# Patient Record
Sex: MALE
[De-identification: names, ages, dates, MRNs, and addresses within clinical notes are randomized per-mention and may not be internally consistent; named-entity substitution may affect disease eponyms.]

---

## 2019-03-04 ENCOUNTER — HOSPITAL ENCOUNTER (OUTPATIENT)
Dept: HOSPITAL 89 - RAD | Age: 24
End: 2019-03-04
Attending: INTERNAL MEDICINE
Payer: COMMERCIAL

## 2019-03-04 DIAGNOSIS — S86.912A: Primary | ICD-10-CM

## 2019-03-04 NOTE — RADIOLOGY IMAGING REPORT
[place] [facility] [place] 

 

PATIENT NAME: Edenilson Toussaint

: 1995

MR: [id number]

V: [id number]

EXAM DATE: 

ORDERING PHYSICIAN: MOISES SHEARER

TECHNOLOGIST: 

 

Location: [facility] - [place]

Patient: Edenilson Toussaint

: 1995

MRN: [id number]

Visit/Account:[id number]

Date of Sevice:  3/04/2019

 

ACCESSION #: [id number]

 

Technique: KNEE 3 VIEW LEFT

 

HISTORY: knee strain

 

Comparison studies:  None

 

FINDINGS: There is no acute fracture.  The alignment of the left knee.  No knee joint effusion.

 

IMPRESSION:

1.  No acute osseous process.

 

Report Dictated By: Gil Covarrubias DO at 3/4/2019 2:54 PM

 

Report E-Signed By: Gil Covarrubias DO  at 3/4/2019 2:55 PM

 

WSN:[id number]

## 2019-03-08 ENCOUNTER — HOSPITAL ENCOUNTER (OUTPATIENT)
Dept: HOSPITAL 89 - CT | Age: 24
End: 2019-03-08
Attending: INTERNAL MEDICINE
Payer: COMMERCIAL

## 2019-03-08 DIAGNOSIS — J32.9: Primary | ICD-10-CM

## 2019-03-08 DIAGNOSIS — S83.412A: ICD-10-CM

## 2019-03-08 PROCEDURE — 70486 CT MAXILLOFACIAL W/O DYE: CPT

## 2019-03-08 NOTE — RADIOLOGY IMAGING REPORT
FACILITY: [facility] 

 

PATIENT NAME: Edenilson Toussaint

: 1995

MR: [id number]

V: [id number]

EXAM DATE: 

ORDERING PHYSICIAN: MOISES SHEARER

TECHNOLOGIST: 

 

Location: [facility] - [place]

Patient: Edenilson Toussaint

: 1995

MRN: [id number]

Visit/Account:[id number]

Date of Sevice:  3/08/2019

 

ACCESSION #: [id number]

 

CT SINUS W/O CON

 

COMPARISONS: None

 

ADDITIONAL PERTINENT HISTORY:  Recurrent sinusitis

 

 

TECHNIQUE: Multiple axial images were obtained through the paranasal sinuses with coronal and sagitta
l reformatted images. No IV contrast was administered.  One of the following dose optimization techni
ques was utilized in the performance of this exam: Automated exposure control; adjustment of the mA a
nd/or kV according to the patient's size; or use of an iterative  reconstruction technique.  Specific
 details can be referenced in the facility's radiology CT exam operational policy.

 

FINDINGS:

 

Maxillary sinuses: Mild mucosal thickening involving the right maxillary sinus..

 

Frontal sinuses: Negative.

 

Ethmoid air cells: Negative.

 

Sphenoid sinuses:Negative.

 

Nasal septum: Negative.

 

Drainage pathways: Patent

 

Paranasal variance: None

 

Medial orbital walls, cribriform plate, and orbital floors: Negative.

 

Visualized bony skull base Negative.

 

Visualized intracranial contents: Negative.

 

Orbits and surrounding soft tissues: Negative.

 

IMPRESSION:

1. Mild mucosal thickening involving the right maxillary sinus.

2. Otherwise negative examination.

 

Report Dictated By: Ruben Guerrero MD at 3/8/2019 12:29 PM

 

Report E-Signed By: Ruben Guerrero MD  at 3/8/2019 12:32 PM

 

WSN:[id number]

## 2019-03-08 NOTE — RADIOLOGY IMAGING REPORT
FACILITY: [facility] 

 

PATIENT NAME: Edenilson Toussaint

: 1995

MR: [id number]

V: [id number]

EXAM DATE: 

ORDERING PHYSICIAN: MOISES SHEARER

TECHNOLOGIST: 

 

Location: [facility]

Patient: Edenilson Toussaint

: 1995

MRN: [id number]

Visit/Account:[id number]

Date of Sevice:  3/08/2019

 

ACCESSION #: [id number]

 

MR KNEE LT W/O CONTRAST

 

COMPARISON:  Left knee radiographs 2019

 

HISTORY:  left knee pain,swelling. Patient reports injury, not otherwise specified.

 

TECHNIQUE:  Noncontrast multiplanar MRI of the left knee utilizing T1 weighted and fluid sensitive se
quences.

 

CONTRAST:  None.

 

FINDINGS:

FLUID:  There is no effusion or Baker's cyst.

 

MENISCI:  The menisci are intact.

 

TENDONS/LIGAMENTS:  The anterior and posterior cruciate ligaments are intact.  Abnormal intermediate 
thickening and signal in the medial collateral ligament with mild adjacent soft tissue edema consiste
nt with acute intermediate grade MCL sprain. The fibular collateral ligament, biceps femoris tendon, 
iliotibial tract and popliteus tendon are intact and unremarkable.  Retinacula and extensor mechanism
 are intact and unremarkable.

 

MUSCLES:  There is no muscle atrophy or edema.

 

CARTILAGE:  No significant cartilage defects in the knee.

 

BONES:  Normal marrow signal and alignment.

 

OTHER:  Negative.

 

 

IMPRESSION:

Recent intermediate grade sprain of the medial collateral ligament of the left knee.

 

 

Report Dictated By: Abdoulaye Roberts at 3/8/2019 2:46 PM

 

Report E-Signed By: Abdoulaye Roberts  at 3/8/2019 3:05 PM

 

WSN:[id number]